# Patient Record
Sex: MALE | Race: WHITE | NOT HISPANIC OR LATINO | Employment: OTHER | URBAN - METROPOLITAN AREA
[De-identification: names, ages, dates, MRNs, and addresses within clinical notes are randomized per-mention and may not be internally consistent; named-entity substitution may affect disease eponyms.]

---

## 2020-12-03 ENCOUNTER — AMBULATORY - HEALTHEAST (OUTPATIENT)
Dept: SURGERY | Facility: CLINIC | Age: 80
End: 2020-12-03

## 2020-12-03 DIAGNOSIS — Z11.59 ENCOUNTER FOR SCREENING FOR OTHER VIRAL DISEASES: ICD-10-CM

## 2021-01-25 ENCOUNTER — AMBULATORY - HEALTHEAST (OUTPATIENT)
Dept: FAMILY MEDICINE | Facility: CLINIC | Age: 81
End: 2021-01-25

## 2021-01-25 DIAGNOSIS — Z11.59 ENCOUNTER FOR SCREENING FOR OTHER VIRAL DISEASES: ICD-10-CM

## 2021-01-26 ENCOUNTER — COMMUNICATION - HEALTHEAST (OUTPATIENT)
Dept: SCHEDULING | Facility: CLINIC | Age: 81
End: 2021-01-26

## 2021-01-26 LAB
SARS-COV-2 PCR COMMENT: NORMAL
SARS-COV-2 RNA SPEC QL NAA+PROBE: NEGATIVE
SARS-COV-2 VIRUS SPECIMEN SOURCE: NORMAL

## 2021-01-26 ASSESSMENT — MIFFLIN-ST. JEOR: SCORE: 1718.23

## 2021-01-28 ENCOUNTER — SURGERY - HEALTHEAST (OUTPATIENT)
Dept: SURGERY | Facility: CLINIC | Age: 81
End: 2021-01-28

## 2021-01-28 ENCOUNTER — ANESTHESIA - HEALTHEAST (OUTPATIENT)
Dept: SURGERY | Facility: CLINIC | Age: 81
End: 2021-01-28

## 2021-01-28 ASSESSMENT — MIFFLIN-ST. JEOR: SCORE: 1709.16

## 2021-05-28 ENCOUNTER — RECORDS - HEALTHEAST (OUTPATIENT)
Dept: ADMINISTRATIVE | Facility: CLINIC | Age: 81
End: 2021-05-28

## 2021-06-05 VITALS — WEIGHT: 206 LBS | HEIGHT: 74 IN | BODY MASS INDEX: 26.44 KG/M2

## 2021-06-14 NOTE — ANESTHESIA PREPROCEDURE EVALUATION
Anesthesia Evaluation      Patient summary reviewed     Airway   Mallampati: I  Neck ROM: full   Pulmonary - negative ROS    breath sounds clear to auscultation                         Cardiovascular   (+) pacemaker, hypertension, CAD, CHF (EF 50-55%, mild AI on 2020 TTE), , hypercholesterolemia,     Rhythm: regular  Rate: normal,         Neuro/Psych - negative ROS     Endo/Other       Comments: Chronic lymphocytic leukemia, no current tx    GI/Hepatic/Renal    (+) GERD,   chronic renal disease CRI,           Dental    (+) lower dentures and upper dentures                       Anesthesia Plan  Planned anesthetic: MAC    ASA 3   Induction: intravenous   Anesthetic plan and risks discussed with: patient

## 2021-06-14 NOTE — ANESTHESIA POSTPROCEDURE EVALUATION
Patient: Xander Day  Procedure(s):  ESOPHAGOGASTRODUODENOSCOPY WITH BIOPSY  Anesthesia type: MAC    Patient location: Phase II Recovery  Last vitals:   Vitals Value Taken Time   /59 01/28/21 1106   Temp 36.7  C (98.1  F) 01/28/21 1044   Pulse 60 01/28/21 1107   Resp 16 01/28/21 1100   SpO2 96 % 01/28/21 1107   Vitals shown include unvalidated device data.  Post vital signs: stable  Level of consciousness: awake and responds to simple questions  Post-anesthesia pain: pain controlled  Post-anesthesia nausea and vomiting: no  Pulmonary: unassisted, return to baseline  Cardiovascular: stable and blood pressure at baseline  Hydration: adequate  Anesthetic events: no    QCDR Measures:  ASA# 11 - Faina-op Cardiac Arrest: ASA11B - Patient did NOT experience unanticipated cardiac arrest  ASA# 12 - Faina-op Mortality Rate: ASA12B - Patient did NOT die  ASA# 13 - PACU Re-Intubation Rate: NA - No ETT / LMA used for case  ASA# 10 - Composite Anes Safety: ASA10A - No serious adverse event    Additional Notes:

## 2021-06-14 NOTE — ANESTHESIA CARE TRANSFER NOTE
Last vitals:   Vitals:    01/28/21 1044   BP: (P) 96/53   Pulse: (P) 62   Resp: (P) 16   Temp: (P) 36.7  C (98.1  F)   SpO2: (P) 98%     Patient's level of consciousness is awake  Spontaneous respirations: yes  Maintains airway independently: yes  Dentition unchanged: yes  Oropharynx: oropharynx clear of all foreign objects    QCDR Measures:  ASA# 20 - Surgical Safety Checklist: WHO surgical safety checklist completed prior to induction    PQRS# 430 - Adult PONV Prevention: NA - Not adult patient, not GA or 3 or more risk factors NOT present  ASA# 8 - Peds PONV Prevention: NA - Not pediatric patient, not GA or 2 or more risk factors NOT present  PQRS# 424 - Faina-op Temp Management: 4559F - At least one body temp DOCUMENTED => 35.5C or 95.9F within required timeframe  PQRS# 426 - PACU Transfer Protocol: - Transfer of care checklist used  ASA# 14 - Acute Post-op Pain: ASA14B - Patient did NOT experience pain >= 7 out of 10

## 2024-07-20 ENCOUNTER — HOSPITAL ENCOUNTER (EMERGENCY)
Facility: HOSPITAL | Age: 84
Discharge: HOME | End: 2024-07-20
Payer: COMMERCIAL

## 2024-07-20 VITALS
RESPIRATION RATE: 21 BRPM | WEIGHT: 202.8 LBS | HEART RATE: 76 BPM | HEIGHT: 75 IN | SYSTOLIC BLOOD PRESSURE: 118 MMHG | OXYGEN SATURATION: 99 % | TEMPERATURE: 98.7 F | DIASTOLIC BLOOD PRESSURE: 64 MMHG | BODY MASS INDEX: 25.22 KG/M2

## 2024-07-20 DIAGNOSIS — S50.312A ABRASION OF SKIN OF LEFT ELBOW: ICD-10-CM
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
## 2024-07-20 PROCEDURE — 99282 EMERGENCY DEPT VISIT SF MDM: CPT

## 2024-07-20 ASSESSMENT — COLUMBIA-SUICIDE SEVERITY RATING SCALE - C-SSRS
2. HAVE YOU ACTUALLY HAD ANY THOUGHTS OF KILLING YOURSELF IN THE PAST MONTH?: NO
6. HAVE YOU EVER DONE ANYTHING, STARTED TO DO ANYTHING, OR PREPARED TO DO ANYTHING TO END YOUR LIFE?: NO
1. IN THE PAST MONTH, HAVE YOU WISHED YOU WERE DEAD OR WISHED YOU COULD GO TO SLEEP AND NOT WAKE UP?: NO

## 2024-07-21 NOTE — DISCHARGE INSTRUCTIONS
Keep the area wrapped and dry for the next 24 hours.    After this, remove the bandage and clean the area at least once daily until the wound heals.    The skin adhesive tape will fall off on its own in 3-5 days. Do not pick at it or rip it off.     Call your doctor now or seek immediate medical care if:    You have signs of infection, such as:  Increased pain, swelling, warmth, or redness around the tear.  Red streaks leading from the tear.  Pus draining from the tear.  A fever.     The tear starts to bleed a lot. Small amounts of blood are normal.   Watch closely for changes in your health, and be sure to contact your doctor if:    You do not get better as expected.

## 2024-07-21 NOTE — ED TRIAGE NOTES
Pt ambulatory to triage with c/o abrasions/lac x2 to left elbow after sustaining fall while out walking with a neighbor. Pt denies hitting head, denies LOC, no thinners.      Triage Assessment (Adult)       Row Name 07/20/24 3079          Triage Assessment    Airway WDL WDL        Respiratory WDL    Respiratory WDL WDL        Skin Circulation/Temperature WDL    Skin Circulation/Temperature WDL X  skin tear to left elbow x2        Cardiac WDL    Cardiac WDL WDL        Peripheral/Neurovascular WDL    Peripheral Neurovascular WDL WDL        Cognitive/Neuro/Behavioral WDL    Cognitive/Neuro/Behavioral WDL WDL

## 2024-07-21 NOTE — ED PROVIDER NOTES
Emergency Department Encounter  [facility] EMERGENCY DEPARTMENT  Xander Day   AGE: 83 year old SEX: male  YOB: 1940  MRN: [id number]      EVALUATION DATE & TIME: No admission date for patient encounter. ; PCP: Grady Darby   ED PROVIDER: Fabienne Arevalo PA-C    CHIEF COMPLAINT: Fall      MEDICAL DECISION MAKING   Xander Day is a 83 year old male with a history of hypertension, hyperlipidemia, peripheral vascular disease, renal insufficiency, congestive heart failure, prostate cancer s/p prostatectomy (2008), iron deficiency anemia, chronic lymphocytic leukemia (CLL), and biventricular implantable cardioverter-defibrillator, who presents to the ED for evaluation of fall that occurred today around 8:30 PM when patient was walking in his neighborhood with his neighbor and his neighbor stumbled into him causing him to misstep and fall onto his left arm.  Patient denies any head injury.  No blood thinners.  Since fall, there has been no vomiting, headache, and seizure-like activity.    Vitals reviewed and hemodynamically stable, afebrile.  On exam, patient is in no acute distress without any obvious signs of injury or deformity. A&Ox4. Head atraumatic. No focal deficits. No midline spinal tenderness. GCS 15.  Full active range of motion of left upper extremity.  No bony tenderness to palpation of left elbow.  Two moderate sized abrasions on the left elbow (see image below).    Left elbow x-ray ordered from triage prior to seeing patient and patient declining x-ray.  Explained to patient that there may still be a fracture despite his ability to flex and extend his left elbow freely and also discussed C-spine imaging and head imaging and the importance of ruling out additional injury.  Patient continues to decline imaging and states that his neck does not hurt and he did not hit his head.  Based on [place] CT head injury/trauma rule, CT head imaging not indicated as  there was no LOC, amnesia, or disorientation.  Would have like to move forward with cervical spine imaging but patient declining, no cervical spine tenderness or reported neck pain so I am comfortable foregoing this at this time but explained to patient that if he develops worsening symptoms to immediately come back to the emergency department.  Considered, but doubt based on evaluation today, intracranial hemorrhage or trauma (neuro neuro deficits, no headache), intra-abdominal trauma (nontender, no rebound or guarding), pneumothorax/hemothorax (normal breath sounds), stroke/TIA (no neuro deficits), and extremity fracture or dislocation (no bony tenderness or deformity).     Abrasion irrigated with saline and wound spray.  Skin flaps secured over abrasions with Steri-Strips.  Abrasions covered with nonocclusive dressing, wrapped in cohesive bandage. Plan to discharge home with symptomatic care and prompt primary care follow up.  Patient was provided with clear, written instructions of potential danger signs and where and when to return for emergency care or re-evaluation.    Medical Decision Making  Obtained supplemental history:Supplemental history obtained?: No  Reviewed external records: External records reviewed?: Documented in chart  Care impacted by chronic illness:Heart Disease, Hyperlipidemia, Hypertension, and Peripheral Vascular Disease  Care significantly affected by social determinants of health:Access to Medical Care  Did you consider but not order tests?: In addition to work-up documented, I considered the following work up: elbow x-ray, patient declined.  Did you interpret images independently?: Independent interpretation of ECG and images noted in documentation, when applicable.  Consultation discussion with other provider:Did you involve another provider (consultant, , pharmacy, etc.)?: No  Discharge. No recommendations on prescription strength medication(s). See documentation for any additional  details.    FINAL IMPRESSION       ICD-10-CM    1. Abrasion of skin of left elbow  S50.312A       2. Fall from slip, trip, or stumble, initial encounter  W01.0XXA           ED COURSE   10:06 PM Per xray tech, patient declining xray imaging of left elbow.  10:08 PM I met and introduced myself to the patient. I gathered initial history and performed my physical exam.  Initial physical exam completed in upright chair in temporary exam room due to boarding crisis in ED while patient awaits for room with assigned RN and vital monitoring. We discussed discharge, follow up, and reasons to return to the ED.     MEDICATIONS GIVEN IN THE EMERGENCY DEPARTMENT:   Medications - No data to display   NEW PRESCRIPTIONS STARTED AT TODAY'S ED VISIT:  New Prescriptions    No medications on file     =================================================================     HISTORY OF PRESENT ILLNESS   Patient information was obtained from: Patient  Use of Intrepreter: N/A     Xander Day is a 83 year old male with a pertinent  history of hypertension, hyperlipidemia, peripheral vascular disease, renal insufficiency, congestive heart failure, prostate cancer s/p prostatectomy (2008), iron deficiency anemia, chronic lymphocytic leukemia (CLL), and biventricular implantable cardioverter-defibrillator who presents to the ED by private car for evaluation of a fall.    Patient reports that he was walking with his neighbor around 8:30 PM earlier this evening when he unfortunately fell. At that time, neighbor stumbled into him, causing him to fall over to his left side, bracing mostly with his hands but also his left elbow. Hands are fine, however, has a couple abrasions to his left elbow. No head injury or LOC. No chronic anticoagulation.    Patient has otherwise been in his normal state of health and denies any other acute medical complaints at this time.     Per chart review,  07/10/2024 - Device check in.    MEDICAL HISTORY     Past Medical  History:   Diagnosis Date    Cancer (H)     Cardiomyopathy (H) 2016    Chest pain 2016    Coronary artery disease involving native coronary artery of native heart without angina pectoris 2016    Essential hypertension 2015    Hypertension     Prostate cancer (H)        Past Surgical History:   Procedure Laterality Date    APPENDECTOMY      ESOPHAGOSCOPY, GASTROSCOPY, DUODENOSCOPY (EGD), COMBINED N/A 2016    Procedure: ESOPHAGOGASTRODUODENOSCOPY with biopsies;  Surgeon: Nato Ambriz MD;  Location: [facility];  Service:     HERNIA REPAIR      JOINT REPLACEMENT Left     OTHER SURGICAL HISTORY      prostaec    PA ESOPHAGOGASTRODUODENOSCOPY TRANSORAL DIAGNOSTIC N/A 2021    Procedure: ESOPHAGOGASTRODUODENOSCOPY WITH BIOPSY;  Surgeon: Marcel Acosta MD;  Location: [facility];  Service: Gastroenterology       Family History   Problem Relation Age of Onset    Anesthesia Reaction No family hx of        Social History     Tobacco Use    Smoking status: Former     Current packs/day: 0.00     Types: Cigarettes     Quit date: 1961     Years since quittin.0    Smokeless tobacco: Never   Substance Use Topics    Alcohol use: Yes     Alcohol/week: 1.0 standard drink of alcohol     Comment: Alcoholic Drinks/day: occasional    Drug use: No       Most Recent Immunizations   Administered Date(s) Administered    COVID-19 12+ () (MODERNA) 2024    COVID-19 Bivalent 12+ (Pfizer) 10/21/2022    COVID-19 MONOVALENT 12+ (Pfizer) 10/07/2021    COVID-19 Monovalent 12+ (Pfizer ) 2022    Flu, Unspecified 2015        aspirin 81 MG EC tablet  atorvastatin (LIPITOR) 40 MG tablet  calcium, as carbonate, (OS-LYNDSEY) 500 mg calcium (1,250 mg) tablet  cholecalciferol, vitamin D3, 1,000 unit tablet  codeine-guaiFENesin (GUAIFENESIN AC)  mg/5 mL liquid  ferrous gluconate 324 mg (37.5 mg iron) Tab  fluticasone (FLONASE) 50 mcg/actuation nasal spray  furosemide (LASIX)  "20 MG tablet  lidocaine (XYLOCAINE) 5 % ointment  lisinopril (PRINIVIL,ZESTRIL) 10 MG tablet  MEDICATION CANNOT BE REORDERED - PLEASE MANUALLY REORDER AND DISCONTINUE THE OLD ORDER  metoprolol succinate (TOPROL-XL) 50 MG 24 hr tablet  multivitamin therapeutic (THERAGRAN) tablet  omeprazole (PRILOSEC) 20 MG capsule  polyethylene glycol (MIRALAX) 17 gram packet  potassium chloride SA (K-DUR,KLOR-CON) 10 MEQ tablet  senna-docusate (SENNOSIDES-DOCUSATE SODIUM) 8.6-50 mg tablet  sotalol (BETAPACE) 120 MG tablet  spironolactone (ALDACTONE) 25 MG tablet  traMADol (ULTRAM) 50 mg tablet      PHYSICAL EXAM   VITALS:  Patient Vitals for the past 24 hrs:   BP Temp Temp src Pulse Resp SpO2 Height Weight   07/20/24 2123 126/62 98.7  F (37.1  C) Oral 76 18 97 % 1.905 m (6' 3\") 92 kg (202 lb 12.8 oz)     Body mass index is 25.35 kg/m .     Vitals reviewed. Nursing notes reviewed.  CONSITUTIONAL: Awake, alert, in no acute distress.   EYES: PERRLA, EOM intact. No subconjunctival hemorrhage or periorbital ecchymosis.   HENT: Normocephalic, atraumatic. No palpable hematoma or depressed skull fracture. No bruising over the mastoid processes.  NECK: Supple, gross ROM intact.  No midline cervical spinal tenderness.  Full active ROM without pain.  RESPIRATORY: Respirations even, unlabored.   CARDIO: Normal heart rate.   GI: Abdomen soft, non-tender.  MSK: No midline thoracic,or lumbar spinal tenderness or palpable stepoffs.  Full active range of motion of left elbow without bony tenderness of left upper extremity.  Moving all 4 extremities intentionally and without pain.  No joint swelling, redness, or obvious deformity.  SKIN:  Warm, dry. Two superficial abrasions to the left elbow.    NEURO: A&O x4. Speech clear and fluent with normal syntax. Grossly normal motor and sensory function throughout all 4 extremities. No focal deficits noted. GCS: 15  PSYCH: Thoughts linear and responses appropriate. Cooperative.      LABS AND IMAGING   All " pertinent labs reviewed and interpreted  Labs Ordered and Resulted from Time of ED Arrival to Time of ED Departure - No data to display    Elbow  XR, G/E 3 views, left    (Results Pending)       PROCEDURES     PROCEDURE: Laceration Repair   INDICATIONS: Laceration   PROCEDURE PROVIDER: Fabienne Arevalo PA-C   SITE: Left elbow   TYPE/SIZE: simple, clean, and no foreign body visualized  Superficial abrasions with skin flap   FUNCTIONAL ASSESSMENT: Distal sensation, circulation, motor, and tendon function intact   MEDICATION: NONE   PREPARATION: irrigation with Normal saline and wound cleanser spray   DEBRIDEMENT: no debridement   CLOSURE:  Superficial layer closed with Steri-strips     Last ZURITA, am serving as a scribe to document services personally performed by Fabienne Arevalo PA-C, based on my observation and the provider's statements to me. I, Fabienne Arevalo PA-C attest that Last Abebe is acting in a scribe capacity, has observed my performance of the services and has documented them in accordance with my direction.     Fabienne Arevalo PA-C   Emergency Medicine   [facility] EMERGENCY DEPARTMENT  [address] [place] [id number]  [phone]  Dept: [phone]      Fabienne Arevalo PA-C  07/20/24 7082